# Patient Record
Sex: FEMALE | Race: WHITE | Employment: OTHER | URBAN - METROPOLITAN AREA
[De-identification: names, ages, dates, MRNs, and addresses within clinical notes are randomized per-mention and may not be internally consistent; named-entity substitution may affect disease eponyms.]

---

## 2017-06-06 PROBLEM — M75.42 SHOULDER IMPINGEMENT, LEFT: Status: ACTIVE | Noted: 2017-06-06

## 2017-06-06 PROBLEM — M75.82 ROTATOR CUFF TENDONITIS, LEFT: Status: ACTIVE | Noted: 2017-06-06

## 2017-10-06 PROBLEM — M75.02 ADHESIVE CAPSULITIS OF LEFT SHOULDER: Status: ACTIVE | Noted: 2017-10-06

## 2019-10-23 PROBLEM — M54.41 ACUTE RIGHT-SIDED LOW BACK PAIN WITH RIGHT-SIDED SCIATICA: Status: ACTIVE | Noted: 2019-10-23

## 2019-10-23 PROBLEM — M53.86 DECREASED RANGE OF MOTION OF INTERVERTEBRAL DISCS OF LUMBAR SPINE: Status: ACTIVE | Noted: 2019-10-23

## 2021-04-02 PROBLEM — R93.1 ABNORMAL CT SCAN OF HEART: Status: ACTIVE | Noted: 2021-01-01

## 2022-01-10 ENCOUNTER — APPOINTMENT (OUTPATIENT)
Dept: GENERAL RADIOLOGY | Age: 72
End: 2022-01-10
Attending: EMERGENCY MEDICINE
Payer: MEDICARE

## 2022-01-10 ENCOUNTER — HOSPITAL ENCOUNTER (OUTPATIENT)
Age: 72
Discharge: HOME | End: 2022-01-10
Attending: EMERGENCY MEDICINE
Payer: MEDICARE

## 2022-01-10 VITALS
OXYGEN SATURATION: 97 % | BODY MASS INDEX: 29.02 KG/M2 | SYSTOLIC BLOOD PRESSURE: 148 MMHG | HEART RATE: 71 BPM | RESPIRATION RATE: 18 BRPM | HEIGHT: 64 IN | DIASTOLIC BLOOD PRESSURE: 64 MMHG | TEMPERATURE: 98 F | WEIGHT: 170 LBS

## 2022-01-10 DIAGNOSIS — S70.02XA CONTUSION OF LEFT HIP, INITIAL ENCOUNTER: ICD-10-CM

## 2022-01-10 DIAGNOSIS — S50.12XA CONTUSION OF LEFT ELBOW AND FOREARM, INITIAL ENCOUNTER: ICD-10-CM

## 2022-01-10 DIAGNOSIS — S46.812A TRAPEZIUS STRAIN, LEFT, INITIAL ENCOUNTER: Primary | ICD-10-CM

## 2022-01-10 PROCEDURE — 73502 X-RAY EXAM HIP UNI 2-3 VIEWS: CPT | Performed by: EMERGENCY MEDICINE

## 2022-01-10 PROCEDURE — 73080 X-RAY EXAM OF ELBOW: CPT | Performed by: EMERGENCY MEDICINE

## 2022-01-10 PROCEDURE — 99204 OFFICE O/P NEW MOD 45 MIN: CPT

## 2022-01-10 RX ORDER — KETOROLAC TROMETHAMINE 10 MG/1
10 TABLET, FILM COATED ORAL EVERY 6 HOURS PRN
Qty: 30 TABLET | Refills: 0 | Status: SHIPPED | OUTPATIENT
Start: 2022-01-10 | End: 2022-01-17

## 2022-01-10 NOTE — ED INITIAL ASSESSMENT (HPI)
Pt here c/o falling on ice yesterday. States she fell on lt side, hitting back of head on concrete. C/o pain to back of head, lt shoulder, lt hip pain.

## 2022-01-10 NOTE — ED PROVIDER NOTES
Patient Seen in: [facility]      History   Patient presents with:  Fall    Stated Complaint: fell on ice,head,shoulders,back pain    Subjective:   HPI    76-year-old woman who slipped on the ice yesterday and hit the back of her head and distress   HEENT: Sclerae anicteric. Conjunctivae show no pallor. Oropharynx clear, mucous membranes moist   Neck: supple, no rigidity. No spinous process tenderness.   She does have left trapezius tenderness  Lungs: good air exchange   Back: Nontender MDM      Patient advised to use Tylenol and Toradol for pain control. She should apply heat to the left trapezius.       Disposition and Plan     Clinical Impression:  Trapezius strain, left, initial encounter  (primary encounter diagnosis)  Con